# Patient Record
Sex: FEMALE | Race: WHITE | NOT HISPANIC OR LATINO | Employment: FULL TIME | URBAN - METROPOLITAN AREA
[De-identification: names, ages, dates, MRNs, and addresses within clinical notes are randomized per-mention and may not be internally consistent; named-entity substitution may affect disease eponyms.]

---

## 2024-05-07 ENCOUNTER — OFFICE VISIT (OUTPATIENT)
Dept: OBSTETRICS AND GYNECOLOGY | Facility: CLINIC | Age: 44
End: 2024-05-07
Payer: COMMERCIAL

## 2024-05-07 VITALS
DIASTOLIC BLOOD PRESSURE: 68 MMHG | HEIGHT: 64 IN | BODY MASS INDEX: 18.98 KG/M2 | WEIGHT: 111.2 LBS | SYSTOLIC BLOOD PRESSURE: 102 MMHG

## 2024-05-07 DIAGNOSIS — Z30.011 ENCOUNTER FOR INITIAL PRESCRIPTION OF CONTRACEPTIVE PILLS: ICD-10-CM

## 2024-05-07 DIAGNOSIS — Z01.419 WELL WOMAN EXAM: Primary | ICD-10-CM

## 2024-05-07 DIAGNOSIS — Z12.31 ENCOUNTER FOR SCREENING MAMMOGRAM FOR MALIGNANT NEOPLASM OF BREAST: ICD-10-CM

## 2024-05-07 PROCEDURE — 1036F TOBACCO NON-USER: CPT

## 2024-05-07 PROCEDURE — 99386 PREV VISIT NEW AGE 40-64: CPT

## 2024-05-07 RX ORDER — NORETHINDRONE 0.35 MG/1
1 TABLET ORAL DAILY
Qty: 90 TABLET | Refills: 3 | Status: SHIPPED | OUTPATIENT
Start: 2024-05-07 | End: 2025-05-07

## 2024-05-07 RX ORDER — SERTRALINE HYDROCHLORIDE 100 MG/1
100 TABLET, FILM COATED ORAL DAILY
COMMUNITY

## 2024-05-07 NOTE — PROGRESS NOTES
Subjective   Jill Vernon is a 43 y.o. female who is here for a routine exam. No vaginal concerns at this time including abnormal discharge, odor or discomfort. She is sexually active with one male partner and has no concern for STI exposure. She has no pain or bleeding with sex. She denies bladder or bowel concerns.      Current contraception: none  LMP: 4/23/2024  Menses: Regular, light  Last pap: 2023, NML HPV-  History of abnormal Pap smear: yes - 2003  Due for pap: No  Family history of uterine or ovarian cancer: no  Family history of prostate cancer: yes,   Family history of pancreatic cancer: no  Family hx of BRCA1/BRCA2: no  Family history of breast cancer: no  Last mammogram: 2022 Nml        OB History    No obstetric history on file.         Review of Systems    Objective   There were no vitals taken for this visit.    Physical Exam  HENT:      Mouth/Throat:      Mouth: Mucous membranes are moist.   Eyes:      Conjunctiva/sclera: Conjunctivae normal.   Neck:      Thyroid: No thyroid mass, thyromegaly or thyroid tenderness.   Cardiovascular:      Rate and Rhythm: Normal rate and regular rhythm.      Pulses: Normal pulses.   Pulmonary:      Effort: Pulmonary effort is normal.      Breath sounds: Normal breath sounds.   Chest:   Breasts:     Breasts are symmetrical.      Right: Normal.      Left: Normal.   Abdominal:      General: Abdomen is flat.      Palpations: Abdomen is soft.      Hernia: There is no hernia in the left inguinal area or right inguinal area.   Genitourinary:     General: Normal vulva.      Uterus: Normal.       Adnexa: Right adnexa normal and left adnexa normal.   Musculoskeletal:         General: Normal range of motion.      Cervical back: Normal range of motion.   Lymphadenopathy:      Cervical: No cervical adenopathy.      Right cervical: No superficial, deep or posterior cervical adenopathy.     Left cervical: No superficial, deep or posterior cervical adenopathy.      Lower Body: No right  inguinal adenopathy. No left inguinal adenopathy.   Skin:     General: Skin is warm.      Capillary Refill: Capillary refill takes less than 2 seconds.   Neurological:      Mental Status: She is alert and oriented to person, place, and time.   Psychiatric:         Mood and Affect: Mood normal.         Behavior: Behavior normal.          Assessment/Plan   A&P --> 43 y.o. y.o woman for annual GYN exam.     - Health Maintenance -->  - Routine follow up with PCP for health maintenance examination encouraged, including TSH, cholesterol, and Vit. D evaluation.  Self breast awareness encouraged; concerning characteristics of breasts reviewed - Pt. will report general concerns, any adherent lumps, skin dimpling/puckering or color changes, and any nipple discharge. Mammogram order placed.     Diet and exercise reviewed.     Pap next pap due in 2028:- Reviewed ACOG and ASCCP guidelines with pt.        - STD Screening: declines     - Contraception Plan: Patient on CHCs in the past. Discussed increase in age and estrogen use increasing risk for venous thrombosis. Reviewed all other options with patient and patient has elected to begin POP's.   Rx for POPs provided:  - Discussed oral contraceptive use including the lack of protection from STDs, and the risks/benefits and alternatives to this therapy.  - Patient informed it is essential that the pill be taken at the same time each day to maximize contraceptive efficacy. Patient aware that if she misses a dose by more than three hours, she must use additional contraception (condoms) for 48 hours after the late dose. Patient aware that POPs are taken continuously with no hormone-free days. Patient educated on the possible side effect of irregular bleeding that typically improves within  6-12 months.   - May start taking pill today, and should use abstinence/condoms for the first 3 days of use.   - Patient aware of all instructions and consents to starting this method. Patient  encouraged to read information packet and be compliant with daily use.     - F/U 1 year or as needed.    JAYDE Ellis-JAMES

## 2024-05-21 ENCOUNTER — HOSPITAL ENCOUNTER (OUTPATIENT)
Dept: RADIOLOGY | Facility: HOSPITAL | Age: 44
Discharge: HOME | End: 2024-05-21
Payer: COMMERCIAL

## 2024-05-21 VITALS — WEIGHT: 110 LBS | BODY MASS INDEX: 18.78 KG/M2 | HEIGHT: 64 IN

## 2024-05-21 DIAGNOSIS — Z12.31 ENCOUNTER FOR SCREENING MAMMOGRAM FOR MALIGNANT NEOPLASM OF BREAST: ICD-10-CM

## 2024-05-21 PROCEDURE — 77063 BREAST TOMOSYNTHESIS BI: CPT | Performed by: STUDENT IN AN ORGANIZED HEALTH CARE EDUCATION/TRAINING PROGRAM

## 2024-05-21 PROCEDURE — 77067 SCR MAMMO BI INCL CAD: CPT

## 2024-05-21 PROCEDURE — 77067 SCR MAMMO BI INCL CAD: CPT | Performed by: STUDENT IN AN ORGANIZED HEALTH CARE EDUCATION/TRAINING PROGRAM

## 2024-05-29 ENCOUNTER — HOSPITAL ENCOUNTER (OUTPATIENT)
Dept: RADIOLOGY | Facility: EXTERNAL LOCATION | Age: 44
Discharge: HOME | End: 2024-05-29
Payer: COMMERCIAL

## 2024-10-25 DIAGNOSIS — F32.A DEPRESSION, UNSPECIFIED DEPRESSION TYPE: Primary | ICD-10-CM

## 2024-10-25 RX ORDER — SERTRALINE HYDROCHLORIDE 100 MG/1
100 TABLET, FILM COATED ORAL DAILY
Qty: 30 TABLET | Refills: 0 | Status: SHIPPED | OUTPATIENT
Start: 2024-10-25

## 2024-11-05 ENCOUNTER — APPOINTMENT (OUTPATIENT)
Dept: PRIMARY CARE | Facility: CLINIC | Age: 44
End: 2024-11-05
Payer: COMMERCIAL

## 2024-11-05 VITALS
BODY MASS INDEX: 18.1 KG/M2 | HEART RATE: 72 BPM | HEIGHT: 64 IN | DIASTOLIC BLOOD PRESSURE: 62 MMHG | OXYGEN SATURATION: 97 % | TEMPERATURE: 97.3 F | WEIGHT: 106 LBS | SYSTOLIC BLOOD PRESSURE: 112 MMHG | RESPIRATION RATE: 20 BRPM

## 2024-11-05 DIAGNOSIS — F41.9 ANXIETY: ICD-10-CM

## 2024-11-05 DIAGNOSIS — R10.11 RUQ ABDOMINAL PAIN: ICD-10-CM

## 2024-11-05 DIAGNOSIS — R14.0 BLOATING: ICD-10-CM

## 2024-11-05 DIAGNOSIS — Z00.00 HEALTH CARE MAINTENANCE: ICD-10-CM

## 2024-11-05 DIAGNOSIS — J30.2 SEASONAL ALLERGIC RHINITIS, UNSPECIFIED TRIGGER: ICD-10-CM

## 2024-11-05 DIAGNOSIS — Z12.11 ENCOUNTER FOR SCREENING FOR MALIGNANT NEOPLASM OF COLON: ICD-10-CM

## 2024-11-05 DIAGNOSIS — Z00.00 ANNUAL PHYSICAL EXAM: Primary | ICD-10-CM

## 2024-11-05 PROBLEM — F32.A DEPRESSION: Status: ACTIVE | Noted: 2024-11-05

## 2024-11-05 PROCEDURE — 99213 OFFICE O/P EST LOW 20 MIN: CPT

## 2024-11-05 PROCEDURE — 99386 PREV VISIT NEW AGE 40-64: CPT

## 2024-11-05 PROCEDURE — 1036F TOBACCO NON-USER: CPT

## 2024-11-05 PROCEDURE — 3008F BODY MASS INDEX DOCD: CPT

## 2024-11-05 RX ORDER — SERTRALINE HYDROCHLORIDE 50 MG/1
50 TABLET, FILM COATED ORAL DAILY
Qty: 90 TABLET | Refills: 3 | Status: SHIPPED | OUTPATIENT
Start: 2024-11-05

## 2024-11-05 NOTE — ASSESSMENT & PLAN NOTE
Patient has a history of gallbladder issues so we will obtain a new right upper quadrant ultrasound.  If she does have gallstones or sludge she may need to have a cholecystectomy nonurgently.

## 2024-11-05 NOTE — PROGRESS NOTES
Subjective   Jill Vernon is a 44 y.o. female who is here for New Patient Visit and Annual Exam.    ANNUAL PHYSICAL EXAM    Concerns:  - Patient has been getting intermittent constipation for her whole life.  She has been told by multiple providers that she needs to take MiraLAX every day but then she just does not.  She thinks she is getting enough water and fiber in her diet.  No bloody or black stools.    - After eating she will occasionally get epigastric or right upper quadrant abdominal discomfort or bloating.  In the past she was told she had sludge in her gallbladder and that she needed to have her gallbladder removed however she never did that.  She used to have acid reflux.  She is also had an upper endoscopy which was normal.  She is not currently following with GI.    - Patient has been having itchy eyes and nose and sneezing recently.  She has no history of allergies and has never taken allergy medicine for this.  No fever or chills.    Specialists that pt follows with: obgyn     Preventative:  - Immunizations: UTD on covid x3, Tdap (2019). Doesn't get flu shots.  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 Former smoker, quit in 2012, smoked 0.5 PPD for 10 years (5 pack year hx). No nicotine, drugs.   - Menstrual periods: None on daily mini pill  - Contraception: mini pill OCP  - Mood: Good on zoloft.  Was on Zoloft 100 mg daily but then she ran out of it because she was switching providers.  For the few days she was off of it she noticed that she was crying a lot which she states  "is unusual for her.  She has now started back up on Zoloft 50 mg daily.  She would like to eventually get off of the medication.    Active Problem List      Comprehensive Medical/Surgical/Social/Family History  History reviewed. No pertinent past medical history.  History reviewed. No pertinent surgical history.  Social History     Social History Narrative    Not on file       Allergies and Medications  Sulfa (sulfonamide antibiotics)  Current Outpatient Medications on File Prior to Visit   Medication Sig Dispense Refill    norethindrone (Micronor) 0.35 mg tablet Take 1 tablet (0.35 mg) over 28 days by mouth once daily. 90 tablet 3    [DISCONTINUED] sertraline (Zoloft) 100 mg tablet Take 1 tablet (100 mg) by mouth once daily. 30 tablet 0     No current facility-administered medications on file prior to visit.       Objective   /62   Pulse 72   Temp 36.3 °C (97.3 °F)   Resp 20   Ht 1.626 m (5' 4\")   Wt 48.1 kg (106 lb)   SpO2 97%   BMI 18.19 kg/m²    PHYSICAL EXAM  Gen: Well appearing, in NAD  Eyes: EOMI  HEENT: MMM. TMs normal. Throat normal.  Heart: RRR, no murmurs  Lungs: No increased work of breathing, CTAB, on RA  GI: Soft, NTND, no guarding or rebound  Extremities: WWP, cap refill <2sec, no pitting edema in LE b/l  Neuro: Alert, symmetrical facies, moves all extremities equally  Skin: No rashes or lesions  Psych: Appropriate mood and affect    Assessment/Plan   - Reviewed Social Determinants of health with patient. Discussed healthy lifestyle, including 150 minutes of physical activity per week.  - Ordered/Reviewed baseline labwork   - Immunizations up to date  - Follow up in 1 year for next annual physical or sooner for acute concerns    Problem List Items Addressed This Visit       Anxiety    Current Assessment & Plan     Continue Zoloft 50 mg daily.  Patient would eventually like to wean off of this.  She is not in talk therapy currently.  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         Relevant Medications    sertraline (Zoloft) 50 mg tablet    Seasonal allergic rhinitis    Current Assessment & Plan     Recommend Zyrtec daily during the spring and fall         RUQ abdominal pain    Current Assessment & Plan     Patient has a history of gallbladder issues so we will obtain a new right upper quadrant ultrasound.  If she does have gallstones or sludge she may need to have a cholecystectomy nonurgently.         Relevant Orders    US gallbladder    Bloating    Current Assessment & Plan     Recommend she start food journaling to see if there are any specific food triggers which causes her bloating/epigastric discomfort/right upper quadrant abdominal discomfort.          Other Visit Diagnoses       Annual physical exam    -  Primary    Health care maintenance        Relevant Orders    CBC    Comprehensive Metabolic Panel    Lipid Panel    TSH with reflex to Free T4 if abnormal    Vitamin D 25-Hydroxy,Total (for eval of Vitamin D levels)    Vitamin B12    Hemoglobin A1C    Encounter for screening for malignant neoplasm of colon        Relevant Orders    Colonoscopy Screening; Average Risk Patient          Mel Bryan D.O.  Family Medicine Physician  [facility]  [address] [place] [address] [phone]    This note has been transcribed using Dragon voice recognition system and there is a possibility of unintentional typing misprints.

## 2024-11-05 NOTE — ASSESSMENT & PLAN NOTE
Recommend she start food journaling to see if there are any specific food triggers which causes her bloating/epigastric discomfort/right upper quadrant abdominal discomfort.

## 2024-11-05 NOTE — ASSESSMENT & PLAN NOTE
Continue Zoloft 50 mg daily.  Patient would eventually like to wean off of this.  She is not in talk therapy currently.  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

## 2024-11-14 ENCOUNTER — OFFICE VISIT (OUTPATIENT)
Dept: OBSTETRICS AND GYNECOLOGY | Facility: CLINIC | Age: 44
End: 2024-11-14
Payer: COMMERCIAL

## 2024-11-14 VITALS
HEIGHT: 64 IN | WEIGHT: 110.4 LBS | BODY MASS INDEX: 18.85 KG/M2 | DIASTOLIC BLOOD PRESSURE: 68 MMHG | SYSTOLIC BLOOD PRESSURE: 100 MMHG

## 2024-11-14 DIAGNOSIS — N64.4 BREAST PAIN: Primary | ICD-10-CM

## 2024-11-14 DIAGNOSIS — R92.343 EXTREMELY DENSE TISSUE OF BOTH BREASTS ON MAMMOGRAPHY: ICD-10-CM

## 2024-11-14 DIAGNOSIS — Z30.09 BIRTH CONTROL COUNSELING: ICD-10-CM

## 2024-11-14 PROCEDURE — 99203 OFFICE O/P NEW LOW 30 MIN: CPT | Performed by: OBSTETRICS & GYNECOLOGY

## 2024-11-14 PROCEDURE — 3008F BODY MASS INDEX DOCD: CPT | Performed by: OBSTETRICS & GYNECOLOGY

## 2024-11-14 PROCEDURE — 1036F TOBACCO NON-USER: CPT | Performed by: OBSTETRICS & GYNECOLOGY

## 2024-11-14 NOTE — PROGRESS NOTES
"GYNECOLOGY PROGRESS NOTE          CC:     Chief Complaint   Patient presents with    New Patient Visit     Here today for right breast eval.  - states about 2 months ago noticed some breast pain along with some changes (shooting pain, and when laying on stomach it is uncomfortable, seems like there is more mass to it.)  Urban estrada ma      HPI:  Jill Vernon is here with new complaint of breast pain.   Location of complaint is RT breast just lateral and below nipple.  Denies any associated nipple discharge or inversion, breast skin changes, breast trauma, or axillary swelling.  Hx of breast biopsy/surgery:  no/no.  She noticed that her breast pain symptoms began after she changed her combination OCPs over to a progesterone-only pill recently.    ----------------------------------------------------------------------------    Breast CA risk factors:  Breast density=extremely dense  Menarche=age 11  1st livebirth=age=30  Prior breast biopsies=no  Menopause age=n/a (pre-menopausal)  HRT use=no  FMHx of BRCA mutation or breast/ovarian CA=no/no    Tyrer-Cuzick calculator=13.9% lifetime risk of breast CA (baseline risk is 10.1%).    ----------------------------------------------------------------------------      ROS:  GYN - see HPI  DERM - see HPI      HISTORY:  Past Surgical History:   Procedure Laterality Date    NO PAST SURGERIES       Cancer-related family history is not on file.       PHYSICAL EXAM:  /68 (BP Location: Left arm, Patient Position: Sitting)   Ht 1.626 m (5' 4\")   Wt 50.1 kg (110 lb 6.4 oz)   BMI 18.95 kg/m²   GEN:  A&O, NAD  BREAST:    -RT:  no masses, no skin lesions or nipple discharge, very dense breast tissue with mild TTP   -LT:   no masses or TTP, no skin lesions or nipple discharge, very dense breast tissue noted  LYMPH:  no axillary or supraclavicular lymphadenopathy  PSYCH:  normal affect, non-anxious      IMAGING RESULTS:  5/7/24 - mammogram=category 1/extremely " dense      IMPRESSION/PLAN:    Problem List Items Addressed This Visit    None  Visit Diagnoses       Breast pain    -  Primary    Extremely dense tissue of both breasts on mammography        Birth control counseling              Breast pain:  RT.  Breast exam normal.  Screening mammogram wnl 5/2024.  Started with change in OCPs to progesterone-only BCP.  Recommend stopping OCPs and seeing if pain improved, if it does not in the next 2-3 weeks will order diagnostic breast imaging--breast US and mammogram--instructed patient to contact office on 12/1 if still having pain and will order this testing.   OTC topical Voltaren NSAID gel recommend for discomfort.    Birth control counseling:  Discussed with the patient that the natural fertility rate > age 45 is lower than the failure rate of her OCPs.  Recommend stopping OCPs altogether, condom use could be considered if still worried but likely unnecessary at her age.    Dense breast tissue:  Alternate imaging discussed and recommend consideration of breast MRI for additional screening due to extremely dense breast tissue.  Availability of self-pay  rapid screening breast MRI  and cost of $249 discussed--instructed patient to notify office if she elects for order.  Discussed with the patient that she can also check with her insurance to see if they will cover this, however in my experience it is not typically covered unless the patient's estimated lifetime breast cancer risk is > 20%.  Patient's risk estimated at 13.9% lifetime based on Tyrer-Bertin calculator (baseline age-only risk would be 10.1%) so patient is not considered high risk.        [place], DO   done

## 2024-11-15 ENCOUNTER — APPOINTMENT (OUTPATIENT)
Dept: OBSTETRICS AND GYNECOLOGY | Facility: CLINIC | Age: 44
End: 2024-11-15
Payer: COMMERCIAL

## 2024-11-19 ENCOUNTER — APPOINTMENT (OUTPATIENT)
Dept: RADIOLOGY | Facility: HOSPITAL | Age: 44
End: 2024-11-19
Payer: COMMERCIAL

## 2024-12-03 ENCOUNTER — APPOINTMENT (OUTPATIENT)
Dept: RADIOLOGY | Facility: HOSPITAL | Age: 44
End: 2024-12-03
Payer: COMMERCIAL

## 2024-12-17 ENCOUNTER — ANESTHESIA EVENT (OUTPATIENT)
Dept: GASTROENTEROLOGY | Facility: EXTERNAL LOCATION | Age: 44
End: 2024-12-17

## 2025-01-06 ENCOUNTER — APPOINTMENT (OUTPATIENT)
Dept: GASTROENTEROLOGY | Facility: EXTERNAL LOCATION | Age: 45
End: 2025-01-06
Payer: COMMERCIAL

## 2025-01-06 ENCOUNTER — ANESTHESIA (OUTPATIENT)
Dept: GASTROENTEROLOGY | Facility: EXTERNAL LOCATION | Age: 45
End: 2025-01-06

## 2025-01-31 ENCOUNTER — APPOINTMENT (OUTPATIENT)
Dept: GASTROENTEROLOGY | Facility: EXTERNAL LOCATION | Age: 45
End: 2025-01-31
Payer: COMMERCIAL

## 2025-02-07 ENCOUNTER — APPOINTMENT (OUTPATIENT)
Dept: GASTROENTEROLOGY | Facility: EXTERNAL LOCATION | Age: 45
End: 2025-02-07
Payer: COMMERCIAL

## 2025-02-07 VITALS
HEIGHT: 64 IN | TEMPERATURE: 98.1 F | DIASTOLIC BLOOD PRESSURE: 59 MMHG | SYSTOLIC BLOOD PRESSURE: 96 MMHG | RESPIRATION RATE: 12 BRPM | OXYGEN SATURATION: 100 % | HEART RATE: 49 BPM | BODY MASS INDEX: 18.78 KG/M2 | WEIGHT: 110 LBS

## 2025-02-07 DIAGNOSIS — Z12.11 ENCOUNTER FOR SCREENING FOR MALIGNANT NEOPLASM OF COLON: ICD-10-CM

## 2025-02-07 LAB — PREGNANCY TEST URINE, POC: NEGATIVE

## 2025-02-07 PROCEDURE — 45385 COLONOSCOPY W/LESION REMOVAL: CPT | Performed by: INTERNAL MEDICINE

## 2025-02-07 RX ORDER — ONDANSETRON HYDROCHLORIDE 2 MG/ML
4 INJECTION, SOLUTION INTRAVENOUS ONCE AS NEEDED
Status: DISCONTINUED | OUTPATIENT
Start: 2025-02-07 | End: 2025-02-08 | Stop reason: HOSPADM

## 2025-02-07 RX ORDER — SODIUM CHLORIDE 9 MG/ML
INJECTION, SOLUTION INTRAVENOUS CONTINUOUS PRN
Status: DISCONTINUED | OUTPATIENT
Start: 2025-02-07 | End: 2025-02-07

## 2025-02-07 RX ORDER — LIDOCAINE HYDROCHLORIDE 20 MG/ML
INJECTION, SOLUTION INFILTRATION; PERINEURAL AS NEEDED
Status: DISCONTINUED | OUTPATIENT
Start: 2025-02-07 | End: 2025-02-07

## 2025-02-07 RX ORDER — PROPOFOL 10 MG/ML
INJECTION, EMULSION INTRAVENOUS AS NEEDED
Status: DISCONTINUED | OUTPATIENT
Start: 2025-02-07 | End: 2025-02-07

## 2025-02-07 RX ADMIN — PROPOFOL 50 MG: 10 INJECTION, EMULSION INTRAVENOUS at 08:49

## 2025-02-07 RX ADMIN — PROPOFOL 50 MG: 10 INJECTION, EMULSION INTRAVENOUS at 08:44

## 2025-02-07 RX ADMIN — PROPOFOL 150 MG: 10 INJECTION, EMULSION INTRAVENOUS at 08:37

## 2025-02-07 RX ADMIN — PROPOFOL 50 MG: 10 INJECTION, EMULSION INTRAVENOUS at 08:40

## 2025-02-07 RX ADMIN — LIDOCAINE HYDROCHLORIDE 50 MG: 20 INJECTION, SOLUTION INFILTRATION; PERINEURAL at 08:37

## 2025-02-07 RX ADMIN — PROPOFOL 50 MG: 10 INJECTION, EMULSION INTRAVENOUS at 08:48

## 2025-02-07 RX ADMIN — SODIUM CHLORIDE: 9 INJECTION, SOLUTION INTRAVENOUS at 08:32

## 2025-02-07 RX ADMIN — PROPOFOL 50 MG: 10 INJECTION, EMULSION INTRAVENOUS at 08:42

## 2025-02-07 SDOH — HEALTH STABILITY: MENTAL HEALTH: CURRENT SMOKER: 0

## 2025-02-07 ASSESSMENT — COLUMBIA-SUICIDE SEVERITY RATING SCALE - C-SSRS
2. HAVE YOU ACTUALLY HAD ANY THOUGHTS OF KILLING YOURSELF?: NO
6. HAVE YOU EVER DONE ANYTHING, STARTED TO DO ANYTHING, OR PREPARED TO DO ANYTHING TO END YOUR LIFE?: NO
1. IN THE PAST MONTH, HAVE YOU WISHED YOU WERE DEAD OR WISHED YOU COULD GO TO SLEEP AND NOT WAKE UP?: NO

## 2025-02-07 ASSESSMENT — PAIN SCALES - GENERAL
PAINLEVEL_OUTOF10: 0 - NO PAIN
PAIN_LEVEL: 0

## 2025-02-07 ASSESSMENT — PAIN - FUNCTIONAL ASSESSMENT
PAIN_FUNCTIONAL_ASSESSMENT: 0-10

## 2025-02-07 NOTE — H&P
Outpatient Hospital Procedure H&P    Patient Profile-Procedures  Initial Info  Patient Demographics  Name Jill May  Date of Birth 1980  MRN [id number]  Address   [address] [id number] [place] [id number]    Primary Phone Number [phone]  Secondary Phone Number    PCP Hannah Allison    Procedure(s):  Colonoscopy  Primary contact name and number   Extended Emergency Contact Information  Primary Emergency Contact: Juan Obrien  Address: [address] [place] [address]  Mobile Phone: [phone]  Relation: Partner    General Health  Weight   Vitals:    25 0816   Weight: 49.9 kg (110 lb)     BMI Body mass index is 18.88 kg/m².    Allergies  Allergies   Allergen Reactions    Sulfa (Sulfonamide Antibiotics) Hives, Other, Shortness of breath and Swelling     As ,uncertain reaction       Past Medical History   History reviewed. No pertinent past medical history.    Provider assessment  Diagnosis: Screeing    Medication Reviewed - yes  Prior to Admission medications    Medication Sig Start Date End Date Taking? Authorizing Provider   sertraline (Zoloft) 50 mg tablet Take 1 tablet (50 mg) by mouth once daily. 24  Yes Mel Bryan,    norethindrone (Micronor) 0.35 mg tablet Take 1 tablet (0.35 mg) over 28 days by mouth once daily. 24  JAYDE Ellis-JAMES       Physical Exam  Vitals:    25 0816   Temp: 36.5 °C (97.7 °F)   SpO2: 98%        General: A&Ox3, NAD.  CV: RRR. No murmur.  Resp: CTA bilaterally. No wheezing, rhonchi or rales.   Extrem: No edema.       Oropharyngeal Classification II (hard and soft palate, upper portion of tonsils and uvula visible)  ASA PS Classification 1  Sedation Plan Deep  Procedure Plan - pre-procedural (re)assesment completed by physician:  discharge/transfer patient when discharge criteria met    Dustin Singh MD  2025 8:25 AM

## 2025-02-07 NOTE — ANESTHESIA PREPROCEDURE EVALUATION
Patient: Jill Vernon    Procedure Information       Anesthesia Start Date/Time: 02/07/25 0832    Scheduled providers: Dustin Singh MD    Procedure: COLONOSCOPY    Location: [facility]            Relevant Problems   Cardiac   (+) Breast pain      Neuro   (+) Anxiety       Clinical information reviewed:   Tobacco  Allergies  Meds   Med Hx  Surg Hx  OB Status  Fam Hx  Soc   Hx        NPO Detail:  NPO/Void Status  Date of Last Liquid: 02/07/25  Time of Last Liquid: 0300  Date of Last Solid: 02/05/25  Time of Last Solid: 1800         Physical Exam    Airway  Mallampati: II  TM distance: >3 FB  Neck ROM: full     Cardiovascular - normal exam     Dental - normal exam     Pulmonary - normal exam  Breath sounds clear to auscultation     Abdominal            Anesthesia Plan    History of general anesthesia?: yes  History of complications of general anesthesia?: no    ASA 1     MAC     The patient is not a current smoker.    intravenous induction   Anesthetic plan and risks discussed with patient.    Plan discussed with CRNA.

## 2025-02-07 NOTE — ANESTHESIA POSTPROCEDURE EVALUATION
Patient: Jill Vernon    Procedure Summary       Date: 02/07/25 Room / Location: [facility]    Anesthesia Start: 0832 Anesthesia Stop:     Procedure: COLONOSCOPY Diagnosis: Encounter for screening for malignant neoplasm of colon    Scheduled Providers: Dustin Singh MD Responsible Provider: MARCIAL Minor    Anesthesia Type: MAC ASA Status: 1            Anesthesia Type: MAC    Vitals Value Taken Time   BP 97/62 02/07/25 0901   Temp 36.7 °C (98.1 °F) 02/07/25 0901   Pulse 76 02/07/25 0901   Resp 16 02/07/25 0901   SpO2 100 % 02/07/25 0901       Anesthesia Post Evaluation    Patient location during evaluation: bedside  Patient participation: complete - patient cannot participate  Level of consciousness: awake and responsive to verbal stimuli  Pain score: 0  Pain management: adequate  Airway patency: patent  Cardiovascular status: acceptable and hemodynamically stable  Respiratory status: acceptable  Hydration status: acceptable  Postoperative Nausea and Vomiting: none        No notable events documented.

## 2025-02-07 NOTE — DISCHARGE INSTRUCTIONS

## 2025-02-13 LAB
LABORATORY COMMENT REPORT: NORMAL
PATH REPORT.FINAL DX SPEC: NORMAL
PATH REPORT.GROSS SPEC: NORMAL
PATH REPORT.TOTAL CANCER: NORMAL

## 2025-05-20 ENCOUNTER — APPOINTMENT (OUTPATIENT)
Dept: OBSTETRICS AND GYNECOLOGY | Facility: CLINIC | Age: 45
End: 2025-05-20
Payer: COMMERCIAL

## 2025-05-21 ENCOUNTER — APPOINTMENT (OUTPATIENT)
Dept: OBSTETRICS AND GYNECOLOGY | Facility: CLINIC | Age: 45
End: 2025-05-21
Payer: COMMERCIAL

## 2025-07-11 DIAGNOSIS — L65.9 HAIR LOSS: Primary | ICD-10-CM

## 2025-09-03 ENCOUNTER — APPOINTMENT (OUTPATIENT)
Dept: OBSTETRICS AND GYNECOLOGY | Facility: CLINIC | Age: 45
End: 2025-09-03
Payer: COMMERCIAL

## 2025-09-03 ASSESSMENT — PATIENT HEALTH QUESTIONNAIRE - PHQ9
1. LITTLE INTEREST OR PLEASURE IN DOING THINGS: NOT AT ALL
2. FEELING DOWN, DEPRESSED OR HOPELESS: NOT AT ALL
SUM OF ALL RESPONSES TO PHQ9 QUESTIONS 1 & 2: 0

## 2025-10-28 ENCOUNTER — APPOINTMENT (OUTPATIENT)
Dept: RADIOLOGY | Facility: HOSPITAL | Age: 45
End: 2025-10-28
Payer: COMMERCIAL

## 2025-11-11 ENCOUNTER — APPOINTMENT (OUTPATIENT)
Dept: PRIMARY CARE | Facility: CLINIC | Age: 45
End: 2025-11-11
Payer: COMMERCIAL

## 2026-01-07 ENCOUNTER — APPOINTMENT (OUTPATIENT)
Dept: OBSTETRICS AND GYNECOLOGY | Facility: CLINIC | Age: 46
End: 2026-01-07
Payer: COMMERCIAL